# Patient Record
Sex: FEMALE | Race: WHITE | NOT HISPANIC OR LATINO | URBAN - METROPOLITAN AREA
[De-identification: names, ages, dates, MRNs, and addresses within clinical notes are randomized per-mention and may not be internally consistent; named-entity substitution may affect disease eponyms.]

---

## 2017-08-15 ENCOUNTER — IMPORTED ENCOUNTER (OUTPATIENT)
Dept: URBAN - METROPOLITAN AREA CLINIC 31 | Facility: CLINIC | Age: 42
End: 2017-08-15

## 2017-08-15 PROBLEM — H18.603: Noted: 2017-08-15

## 2017-08-15 PROCEDURE — 92014 COMPRE OPH EXAM EST PT 1/>: CPT

## 2017-08-15 PROCEDURE — 92310 CONTACT LENS FITTING OU: CPT

## 2018-08-21 ENCOUNTER — IMPORTED ENCOUNTER (OUTPATIENT)
Dept: URBAN - METROPOLITAN AREA CLINIC 31 | Facility: CLINIC | Age: 43
End: 2018-08-21

## 2018-08-21 PROBLEM — H18.603: Noted: 2018-08-21

## 2018-08-21 PROCEDURE — 92014 COMPRE OPH EXAM EST PT 1/>: CPT

## 2018-08-21 PROCEDURE — 92310 CONTACT LENS FITTING OU: CPT

## 2018-08-21 PROCEDURE — 92025 CPTRIZED CORNEAL TOPOGRAPHY: CPT

## 2018-08-21 NOTE — PATIENT DISCUSSION
Refractive error - Continue present contact lens modality. Stop/wear and call if any redness pain or decrease in vision occur.

## 2018-08-21 NOTE — PATIENT DISCUSSION
1. Keratoconus OU - can be caused by eye rubbing or can be inherited. Explained importance of no eye rubbing to prevent progression. Treatment options discussed2. Refractive error - Continue present contact lens modality. Stop/wear and call if any redness pain or decrease in vision occur. 3.  Return for an appointment in 1 year for comprehensive exam and michael with Dr. Naa Crouch.

## 2019-01-29 NOTE — PATIENT DISCUSSION
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 Patient is unsure about direct injury/impacts to right eye but has mentioned events that may have involved head injuries. Pending visual field results, may send patient to Dr. Melonie Delacruz to check for active inflammation.

## 2019-04-07 NOTE — PATIENT DISCUSSION
1. Keratoconus OU - can be caused by eye rubbing or can be inherited. Explained importance of no eye rubbing to prevent progression. Treatment options discussed2. Refractive error - Continue present contact lens modality. Stop/wear and call if any redness pain or decrease in vision occur. Scleral lenses in good. 3. Return for an appointment in 1 year for comprehensive exam. with Dr. Maria Elena Wilkerson.
previously intubated - no problems

## 2019-08-20 ENCOUNTER — IMPORTED ENCOUNTER (OUTPATIENT)
Dept: URBAN - METROPOLITAN AREA CLINIC 31 | Facility: CLINIC | Age: 44
End: 2019-08-20

## 2019-08-20 PROBLEM — H18.603: Noted: 2019-08-20

## 2019-08-20 PROCEDURE — 92014 COMPRE OPH EXAM EST PT 1/>: CPT

## 2019-08-20 PROCEDURE — 92310 CONTACT LENS FITTING OU: CPT

## 2019-08-20 PROCEDURE — 92025 CPTRIZED CORNEAL TOPOGRAPHY: CPT

## 2019-08-20 NOTE — PATIENT DISCUSSION
1.  Refractive error - Continue present scleral contact lens modality. Stop/wear and call if any redness pain or decrease in vision occur. 2.  Keratoconus OU - sp CCL with Dr. Ja Gallardo. Stable. 3. Return for an appointment in 1 year for comprehensive exam and michael with Dr. Osmany Vizcarra.

## 2019-09-10 NOTE — PATIENT DISCUSSION
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 VISION DOING WELL. WILL FOLLOW-UP YEARLY NOW DURING REGULAR COMPREHENSIVE EYE EXAMINATIONS.

## 2020-08-24 ENCOUNTER — IMPORTED ENCOUNTER (OUTPATIENT)
Dept: URBAN - METROPOLITAN AREA CLINIC 31 | Facility: CLINIC | Age: 45
End: 2020-08-24

## 2020-08-24 PROBLEM — H18.603: Noted: 2020-08-24

## 2020-08-24 PROCEDURE — 92310 CONTACT LENS FITTING OU: CPT

## 2020-08-24 PROCEDURE — 92025 CPTRIZED CORNEAL TOPOGRAPHY: CPT

## 2020-08-24 PROCEDURE — 92014 COMPRE OPH EXAM EST PT 1/>: CPT

## 2020-08-24 NOTE — PATIENT DISCUSSION
1.  Refractive error - Continue present scleral contact lens modality. Stop/wear and call if any redness pain or decrease in vision occur. 2.  Keratoconus OU - sp CCL with Dr. Nimo Mitchell. Stable. 3. Return for an appointment in 1 year for comprehensive exam and michael with Dr. Dena Schmitz.

## 2021-08-09 ENCOUNTER — IMPORTED ENCOUNTER (OUTPATIENT)
Dept: URBAN - METROPOLITAN AREA CLINIC 31 | Facility: CLINIC | Age: 46
End: 2021-08-09

## 2021-08-09 PROBLEM — H18.603: Noted: 2021-08-09

## 2021-08-09 PROCEDURE — 92025 CPTRIZED CORNEAL TOPOGRAPHY: CPT

## 2021-08-09 PROCEDURE — 92015 DETERMINE REFRACTIVE STATE: CPT

## 2021-08-09 PROCEDURE — 92014 COMPRE OPH EXAM EST PT 1/>: CPT

## 2021-08-09 NOTE — PATIENT DISCUSSION
1.  Refractive error - Scleral lens fit and VA good with both lenses in good condition. Continue present scleral contact lens modality. Stop/wear and call if any redness pain or decrease in vision occur. 2.  Keratoconus OU - sp CCL with Dr. Mar Dennis. 3. Return for an appointment in 1 year for comprehensive exam and michael with Dr. Alejo Noguera.

## 2022-04-02 ASSESSMENT — VISUAL ACUITY
OD_SC: 20/20
OS_SC: 20/40+1
OS_SC: 20/20-3
OD_SC: 20/20-2
OU_CC: J1+
OU_SC: 20/20

## 2022-04-02 ASSESSMENT — TONOMETRY
OS_IOP_MMHG: 11
OS_IOP_MMHG: 12
OD_IOP_MMHG: 10
OD_IOP_MMHG: 11
OD_IOP_MMHG: 11
OS_IOP_MMHG: 11
OD_IOP_MMHG: 11
OS_IOP_MMHG: 11
OD_IOP_MMHG: 12
OS_IOP_MMHG: 11

## 2022-08-22 ENCOUNTER — PREPPED CHART (OUTPATIENT)
Dept: URBAN - METROPOLITAN AREA CLINIC 29 | Facility: CLINIC | Age: 47
End: 2022-08-22

## 2022-11-30 ENCOUNTER — COMPREHENSIVE EXAM (OUTPATIENT)
Dept: URBAN - METROPOLITAN AREA CLINIC 29 | Facility: CLINIC | Age: 47
End: 2022-11-30

## 2022-11-30 DIAGNOSIS — H18.613: ICD-10-CM

## 2022-11-30 PROCEDURE — 92014 COMPRE OPH EXAM EST PT 1/>: CPT

## 2022-11-30 PROCEDURE — 92025 CPTRIZED CORNEAL TOPOGRAPHY: CPT

## 2022-11-30 ASSESSMENT — VISUAL ACUITY
OS_PH: 20/20
OD_CC: 20/20
OS_CC: 20/40

## 2022-11-30 ASSESSMENT — TONOMETRY
OS_IOP_MMHG: 10
OD_IOP_MMHG: 10

## 2024-01-25 ENCOUNTER — COMPREHENSIVE EXAM (OUTPATIENT)
Dept: URBAN - METROPOLITAN AREA CLINIC 29 | Facility: CLINIC | Age: 49
End: 2024-01-25

## 2024-01-25 DIAGNOSIS — H52.11: ICD-10-CM

## 2024-01-25 DIAGNOSIS — H52.4: ICD-10-CM

## 2024-01-25 DIAGNOSIS — H52.213: ICD-10-CM

## 2024-01-25 DIAGNOSIS — H18.613: ICD-10-CM

## 2024-01-25 PROCEDURE — 92025 CPTRIZED CORNEAL TOPOGRAPHY: CPT

## 2024-01-25 PROCEDURE — 92072 FITG C-LENS KERATOCONUS 1ST: CPT

## 2024-01-25 PROCEDURE — 92014 COMPRE OPH EXAM EST PT 1/>: CPT

## 2024-01-25 PROCEDURE — 92015 DETERMINE REFRACTIVE STATE: CPT

## 2024-01-25 ASSESSMENT — VISUAL ACUITY
OS_CC: 20/25+2
OS_CC: 20/20-2
OD_CC: 20/20
OD_CC: 20/25-2

## 2024-01-25 ASSESSMENT — TONOMETRY
OD_IOP_MMHG: 11
OS_IOP_MMHG: 12

## 2025-01-27 ENCOUNTER — COMPREHENSIVE EXAM (OUTPATIENT)
Age: 50
End: 2025-01-27

## 2025-01-27 DIAGNOSIS — H52.4: ICD-10-CM

## 2025-01-27 DIAGNOSIS — H52.11: ICD-10-CM

## 2025-01-27 DIAGNOSIS — H52.213: ICD-10-CM

## 2025-01-27 PROCEDURE — 92015 DETERMINE REFRACTIVE STATE: CPT

## 2025-01-27 PROCEDURE — 92310-2 LEVEL 2 SOFT LENS UPDATE: Mod: 21

## 2025-01-27 PROCEDURE — 92014 COMPRE OPH EXAM EST PT 1/>: CPT
